# Patient Record
Sex: FEMALE | Race: WHITE
[De-identification: names, ages, dates, MRNs, and addresses within clinical notes are randomized per-mention and may not be internally consistent; named-entity substitution may affect disease eponyms.]

---

## 2019-07-08 ENCOUNTER — HOSPITAL ENCOUNTER (EMERGENCY)
Dept: HOSPITAL 88 - ER | Age: 84
LOS: 1 days | Discharge: NURSING, REHAB OR LONG-TERM CARE | End: 2019-07-09
Payer: COMMERCIAL

## 2019-07-08 VITALS — HEIGHT: 63 IN | WEIGHT: 104 LBS | BODY MASS INDEX: 18.43 KG/M2

## 2019-07-08 DIAGNOSIS — G30.9: ICD-10-CM

## 2019-07-08 DIAGNOSIS — G25.81: ICD-10-CM

## 2019-07-08 DIAGNOSIS — E78.5: ICD-10-CM
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
## 2019-07-08 DIAGNOSIS — F02.80: ICD-10-CM

## 2019-07-08 DIAGNOSIS — S51.802A: Primary | ICD-10-CM
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
## 2019-07-08 PROCEDURE — 71045 X-RAY EXAM CHEST 1 VIEW: CPT

## 2019-07-08 PROCEDURE — 72170 X-RAY EXAM OF PELVIS: CPT

## 2019-07-08 PROCEDURE — 74176 CT ABD & PELVIS W/O CONTRAST: CPT

## 2019-07-08 PROCEDURE — 72125 CT NECK SPINE W/O DYE: CPT

## 2019-07-08 PROCEDURE — 70450 CT HEAD/BRAIN W/O DYE: CPT

## 2019-07-08 PROCEDURE — 99284 EMERGENCY DEPT VISIT MOD MDM: CPT

## 2019-07-08 NOTE — DIAGNOSTIC IMAGING REPORT
PELVIS AP 1-2 VIEWS - 3 views



HISTORY:  Pain.    

COMPARISON: None available.

     

FINDINGS:

Bones:

Nondisplaced left superior and inferior pubic ramus fractures.



Joints:

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

The acetabular component is rotated lateral.

Degenerative changes in lower lumbar spine hips and sacroiliac joints.



Soft tissues:

The soft tissues appear unremarkable.



IMPRESSION: 

Nondisplaced left superior and inferior pubic ramus fractures.

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

rotated lateral.



Signed by: Archie William DO on 7/8/2019 10:37 PM

## 2019-07-08 NOTE — DIAGNOSTIC IMAGING REPORT
Examination: CT head without contrast

Clinical Indication: Fall with head injury.

Technique: Transaxial noncontrast images from the skull base through the vertex

were obtained. Sagittal and coronal reformatted images were done.

Dose modulation, iterative reconstruction, and/or weight based adjustment of

the mA/kV was utilized to reduce the radiation dose to as low as reasonably

achievable. 

Comparison: None.



Findings:



Scalp: No abnormalities.

Bones: Intact. No fractures.  No blastic or lytic lesions. 



Brain sulci: Mild volume loss for patient's age.

Ventricles: No hydrocephalus. 



Extra-axial space:

No abnormalities.



Parenchyma: 

There are confluent areas of low-attenuation within subcortical and

periventricular white matter, nonspecific, but could represent microvascular

ischemic disease.

No masses, hemorrhage, or acute or chronic cortical based vascular insults.



Suprasellar region: No abnormalities.

Craniocervical junction: The foramen magnum is patent.  No Chiari one

malformation.



Incidental findings: 

Atherosclerotic calcification of the cavernous and supraclinoid internal

carotid arteries.

Right sphenoid sinus partial opacification. 



Impression:



1.  No acute intracranial finding.



2.  Mild chronic microvascular ischemic change and generalized volume loss.



Signed by: Dr. Alejandra Villanueva M.D. on 7/8/2019 9:12 PM

## 2019-07-08 NOTE — DIAGNOSTIC IMAGING REPORT
Examination: CT CERVICAL SPINE WO CONTRAST



HISTORY:Neck pain and injury after fall.

COMPARISON:None.

TECHNIQUE: Multidetector helical axial images were obtained without contrast

from the foramen magnum to T1.  Coronal and sagittal reformatted images were

done. Bone and soft tissue windows were evaluated. 

Dose modulation, iterative reconstruction, and/or weight based adjustment of

the mA/kV was utilized to reduce the radiation dose to as low as reasonably

achievable. 



FINDINGS:  

Alignment:Grade I anterolisthesis of C4 on C5 and C7 on T1.

Vertebrae:  Normal height and density. No acute fracture, infection or

neoplasm. Large retrodental partially calcified soft tissue which could be due

to rheumatoid arthritis or CPPD. There is no underlying erosive change of the

dens, making rheumatoid arthritis less likely. This soft tissue mass cause mild

canal stenosis at C1-C2.

Caliber of spinal canal: Developmentally normal.



Posterior fossa and craniocervical junction: Foramen magnum patent. No Chiari 1

malformation.



Soft tissues: No abnormality.



Degenerative changes:

Anterior osteophytes from C2 through T1.

C3-C4, C4-C5: Diffuse disc osteophyte complex and severe bilateral facet

arthropathy result in mild bilateral neural foraminal narrowing. No canal

stenosis. 

C5-C6: Diffuse disc osteophyte complex and severe bilateral facet arthropathy

result in moderate bilateral neural foraminal narrowing. No canal stenosis. 

No disc herniation or foraminal or canal stenosis.



Visualized lung apices:

Biapical pleural thickening.



IMPRESSION:

1.  No acute abnormalities.



2.  Degenerative changes, as above. Findings in the retrodental space could be

from CPPD or rheumatoid arthritis. 



Signed by: Dr. Alejandra Villanueva M.D. on 7/8/2019 9:48 PM

## 2019-07-08 NOTE — DIAGNOSTIC IMAGING REPORT
EXAMINATION:  CHEST SINGLE (PORTABLE)    



INDICATION: Fall  

COMPARISON:  None

     

FINDINGS:  AP view   



TUBES and LINES:  None.



LUNGS:  Low lung volumes. Consolidative and reticular opacities in the left

lung and reticular opacities in the right lung.



PLEURA:  No pleural effusion or pneumothorax.



HEART AND MEDIASTINUM:  The cardiomediastinal silhouette is unremarkable.

Aortic arch calcifications.



BONES AND SOFT TISSUES:  No acute osseous lesion.  Soft tissues are

unremarkable.



UPPER ABDOMEN: Vague lucency in the right upper abdomen with question of intra

and extraluminal air delineating the walls of bowel loops in the right upper

quadrant.



IMPRESSION: 



1.  Bilateral reticular opacities and questionable consolidative opacity in the

right lower lung, likely chronic lung disease with possible acute airspace

disease in the left lower lobe.

2.  Findings in the right upper quadrant are concerning for pneumoperitoneum,

recommend abdominal CT for further evaluation.



Findings discussed with Dr. Lizarraga at 10:15 PM on 7/8/2019 but it curled to

be telephoned.



Signed by: Archie William DO on 7/8/2019 10:30 PM

## 2019-07-09 VITALS — DIASTOLIC BLOOD PRESSURE: 79 MMHG | SYSTOLIC BLOOD PRESSURE: 142 MMHG

## 2019-07-09 NOTE — DIAGNOSTIC IMAGING REPORT
EXAM: CT Abdomen and Pelvis WITHOUT contrast  

INDICATION: Concern for pneumoperitoneum  

COMPARISON: Chest radiograph 7/8/2019, pelvis radiograph 7/8/2019.

TECHNIQUE: Abdomen and pelvis were scanned utilizing a multidetector helical

scanner from the lung base to the pubic symphysis without administration of IV

contrast. Absence of intravenous contrast decreases sensitivity for detection

of focal lesions and vascular pathology. Coronal and sagittal reformations were

obtained. Routine protocol was performed. 

     IV CONTRAST: None

     ORAL CONTRAST: None

            

COMPLICATIONS: None



RADIATION DOSE:

     Total DLP: 211 mGy*cm

     Estimated effective dose: (DLP x 0.015 x size factor) mSv

     CTDIvol has been reviewed. It is below the limits set by the Radiation

Protocol Committee (RPC).

     Dose modulation, iterative reconstruction, and/or weight based adjustment

of the mA/kV was utilized to reduce the radiation dose to as low as reasonably

achievable. 



FINDINGS:



LINES and TUBES: None.



LOWER THORAX:  Diffuse reticular opacities in the lower lungs, worse on the

left, consistent with pulmonary fibrosis. Coronary artery calcifications.



HEPATOBILIARY:      No focal hepatic lesions. No biliary ductal dilation. 



GALLBLADDER: Cholecystectomy clips.



SPLEEN: No splenomegaly. 



PANCREAS: No focal masses or ductal dilatation.  



ADRENALS: No adrenal nodules    



KIDNEYS/URETERS:  Small kidneys. No hydronephrosis. No cystic or solid mass

lesions.  No stones.



GI TRACT: No abnormal distention, wall thickening, or evidence of bowel

obstruction.  Moderate stool throughout the colon. The findings in the right

upper abdomen seen on same day's chest radiograph are gas and stool-filled

loops of bowel, not pneumoperitoneum. Appendix is normal.



PELVIC ORGANS/BLADDER: Moderate urinary bladder distention.



LYMPH NODES: No lymphadenopathy.



VESSELS: There is mild atherosclerotic disease in the aorta and major arterial

branches.



PERITONEUM / RETROPERITONEUM: No free air or fluid.



BONES: Nondisplaced acute right superior pubic ramus fracture. Healing

nondisplaced subacute left superior and inferior pubic ramus fractures. Severe

degenerative changes in the lumbar spine with rightward convex curvature. Right

hip total arthroplasty with malpositioned acetabular component, rotated

lateral.



SOFT TISSUES: Unremarkable.            



IMPRESSION: 



1.  No pneumoperitoneum. Findings of constipation.

2.  Nondisplaced acute right superior pubic ramus fracture. 

3.  Healing nondisplaced subacute left superior and inferior pubic ramus

fractures.

4.  Moderate urinary bladder distention, correlate for urinary retention.

5.  Findings of pulmonary fibrosis without consolidation.



Signed by: Archie William DO on 7/9/2019 12:36 AM